# Patient Record
Sex: FEMALE | Race: BLACK OR AFRICAN AMERICAN | NOT HISPANIC OR LATINO | URBAN - METROPOLITAN AREA
[De-identification: names, ages, dates, MRNs, and addresses within clinical notes are randomized per-mention and may not be internally consistent; named-entity substitution may affect disease eponyms.]

---

## 2018-12-10 ENCOUNTER — INPATIENT (INPATIENT)
Facility: HOSPITAL | Age: 48
LOS: 2 days | Discharge: HOME | DRG: 101 | End: 2018-12-13
Attending: PSYCHIATRY & NEUROLOGY | Admitting: PSYCHIATRY & NEUROLOGY
Payer: MEDICARE

## 2018-12-10 VITALS
RESPIRATION RATE: 18 BRPM | OXYGEN SATURATION: 98 % | SYSTOLIC BLOOD PRESSURE: 124 MMHG | WEIGHT: 265 LBS | DIASTOLIC BLOOD PRESSURE: 8 MMHG | HEART RATE: 108 BPM

## 2018-12-10 LAB
ALBUMIN SERPL ELPH-MCNC: 3.7 G/DL — SIGNIFICANT CHANGE UP (ref 3.3–5)
ALP SERPL-CCNC: 109 U/L — SIGNIFICANT CHANGE UP (ref 40–120)
ALT FLD-CCNC: 27 U/L — SIGNIFICANT CHANGE UP (ref 10–45)
ANION GAP SERPL CALC-SCNC: 14 MMOL/L — SIGNIFICANT CHANGE UP (ref 5–17)
AST SERPL-CCNC: 36 U/L — SIGNIFICANT CHANGE UP (ref 10–40)
BASE EXCESS BLDV CALC-SCNC: -2.6 MMOL/L — SIGNIFICANT CHANGE UP
BASOPHILS NFR BLD AUTO: 0.5 % — SIGNIFICANT CHANGE UP (ref 0–2)
BILIRUB SERPL-MCNC: <0.2 MG/DL — SIGNIFICANT CHANGE UP (ref 0.2–1.2)
BUN SERPL-MCNC: 11 MG/DL — SIGNIFICANT CHANGE UP (ref 7–23)
CALCIUM SERPL-MCNC: 8.4 MG/DL — SIGNIFICANT CHANGE UP (ref 8.4–10.5)
CHLORIDE SERPL-SCNC: 100 MMOL/L — SIGNIFICANT CHANGE UP (ref 96–108)
CO2 SERPL-SCNC: 24 MMOL/L — SIGNIFICANT CHANGE UP (ref 22–31)
CREAT SERPL-MCNC: 0.79 MG/DL — SIGNIFICANT CHANGE UP (ref 0.5–1.3)
EOSINOPHIL NFR BLD AUTO: 2.8 % — SIGNIFICANT CHANGE UP (ref 0–6)
GLUCOSE SERPL-MCNC: 111 MG/DL — HIGH (ref 70–99)
HCO3 BLDV-SCNC: 22 MMOL/L — SIGNIFICANT CHANGE UP (ref 20–27)
HCT VFR BLD CALC: 37.3 % — SIGNIFICANT CHANGE UP (ref 34.5–45)
HGB BLD-MCNC: 12.3 G/DL — SIGNIFICANT CHANGE UP (ref 11.5–15.5)
LYMPHOCYTES # BLD AUTO: 26.4 % — SIGNIFICANT CHANGE UP (ref 13–44)
MCHC RBC-ENTMCNC: 31.8 PG — SIGNIFICANT CHANGE UP (ref 27–34)
MCHC RBC-ENTMCNC: 33 G/DL — SIGNIFICANT CHANGE UP (ref 32–36)
MCV RBC AUTO: 96.4 FL — SIGNIFICANT CHANGE UP (ref 80–100)
MONOCYTES NFR BLD AUTO: 12 % — SIGNIFICANT CHANGE UP (ref 2–14)
NEUTROPHILS NFR BLD AUTO: 58.3 % — SIGNIFICANT CHANGE UP (ref 43–77)
PCO2 BLDV: 40 MMHG — LOW (ref 41–51)
PH BLDV: 7.37 — SIGNIFICANT CHANGE UP (ref 7.32–7.43)
PHENYTOIN FREE SERPL-MCNC: 27.3 UG/ML — HIGH (ref 10–20)
PLATELET # BLD AUTO: 259 K/UL — SIGNIFICANT CHANGE UP (ref 150–400)
PO2 BLDV: 52 MMHG — SIGNIFICANT CHANGE UP
POTASSIUM SERPL-MCNC: 5 MMOL/L — SIGNIFICANT CHANGE UP (ref 3.5–5.3)
POTASSIUM SERPL-SCNC: 5 MMOL/L — SIGNIFICANT CHANGE UP (ref 3.5–5.3)
PROT SERPL-MCNC: 7.2 G/DL — SIGNIFICANT CHANGE UP (ref 6–8.3)
RBC # BLD: 3.87 M/UL — SIGNIFICANT CHANGE UP (ref 3.8–5.2)
RBC # FLD: 13.4 % — SIGNIFICANT CHANGE UP (ref 10.3–16.9)
SAO2 % BLDV: 86 % — SIGNIFICANT CHANGE UP
SODIUM SERPL-SCNC: 138 MMOL/L — SIGNIFICANT CHANGE UP (ref 135–145)
WBC # BLD: 6.5 K/UL — SIGNIFICANT CHANGE UP (ref 3.8–10.5)
WBC # FLD AUTO: 6.5 K/UL — SIGNIFICANT CHANGE UP (ref 3.8–10.5)

## 2018-12-10 PROCEDURE — 70450 CT HEAD/BRAIN W/O DYE: CPT | Mod: 26

## 2018-12-10 RX ORDER — SODIUM CHLORIDE 9 MG/ML
1000 INJECTION INTRAMUSCULAR; INTRAVENOUS; SUBCUTANEOUS ONCE
Qty: 0 | Refills: 0 | Status: COMPLETED | OUTPATIENT
Start: 2018-12-10 | End: 2018-12-10

## 2018-12-10 RX ORDER — ACETAMINOPHEN 500 MG
975 TABLET ORAL ONCE
Qty: 0 | Refills: 0 | Status: COMPLETED | OUTPATIENT
Start: 2018-12-10 | End: 2018-12-10

## 2018-12-10 RX ORDER — METOCLOPRAMIDE HCL 10 MG
10 TABLET ORAL ONCE
Qty: 0 | Refills: 0 | Status: COMPLETED | OUTPATIENT
Start: 2018-12-10 | End: 2018-12-10

## 2018-12-10 RX ADMIN — Medication 975 MILLIGRAM(S): at 23:19

## 2018-12-10 RX ADMIN — Medication 10 MILLIGRAM(S): at 23:20

## 2018-12-10 RX ADMIN — SODIUM CHLORIDE 2000 MILLILITER(S): 9 INJECTION INTRAMUSCULAR; INTRAVENOUS; SUBCUTANEOUS at 23:20

## 2018-12-10 NOTE — ED ADULT NURSE NOTE - OBJECTIVE STATEMENT
pt received awake and alert, no sob, skin warm and dry, no seizure activity noted, will continue to monitor

## 2018-12-10 NOTE — ED ADULT TRIAGE NOTE - CHIEF COMPLAINT QUOTE
seizure x 5  witnessed by EMS, pt alert Ox 3 now, seizure x 5  witnessed by EMS  + brief"  LOC no bladder incontinence reported, now alert Ox3 Versed IV 10 mg by EMS

## 2018-12-10 NOTE — ED ADULT NURSE NOTE - NSIMPLEMENTINTERV_GEN_ALL_ED
Implemented All Universal Safety Interventions:  [place] to call system. Call bell, personal items and telephone within reach. Instruct patient to call for assistance. Room bathroom lighting operational. Non-slip footwear when patient is off stretcher. Physically safe environment: no spills, clutter or unnecessary equipment. Stretcher in lowest position, wheels locked, appropriate side rails in place.

## 2018-12-10 NOTE — ED ADULT NURSE NOTE - CHIEF COMPLAINT QUOTE
seizure x 5  witnessed by EMS  + brief"  LOC no bladder incontinence reported, now alert Ox3 Versed IV 10 mg by EMS

## 2018-12-11 DIAGNOSIS — R63.8 OTHER SYMPTOMS AND SIGNS CONCERNING FOOD AND FLUID INTAKE: ICD-10-CM

## 2018-12-11 DIAGNOSIS — Z91.89 OTHER SPECIFIED PERSONAL RISK FACTORS, NOT ELSEWHERE CLASSIFIED: ICD-10-CM

## 2018-12-11 DIAGNOSIS — Z87.828 PERSONAL HISTORY OF OTHER (HEALED) PHYSICAL INJURY AND TRAUMA: Chronic | ICD-10-CM

## 2018-12-11 DIAGNOSIS — R53.1 WEAKNESS: ICD-10-CM

## 2018-12-11 DIAGNOSIS — I10 ESSENTIAL (PRIMARY) HYPERTENSION: ICD-10-CM

## 2018-12-11 DIAGNOSIS — G40.909 EPILEPSY, UNSPECIFIED, NOT INTRACTABLE, WITHOUT STATUS EPILEPTICUS: ICD-10-CM

## 2018-12-11 LAB
APPEARANCE UR: ABNORMAL
BILIRUB UR-MCNC: NEGATIVE — SIGNIFICANT CHANGE UP
COLOR SPEC: YELLOW — SIGNIFICANT CHANGE UP
DIFF PNL FLD: ABNORMAL
GLUCOSE UR QL: NEGATIVE — SIGNIFICANT CHANGE UP
KETONES UR-MCNC: NEGATIVE — SIGNIFICANT CHANGE UP
LACTATE SERPL-SCNC: 3.5 MMOL/L — HIGH (ref 0.5–2)
LEUKOCYTE ESTERASE UR-ACNC: NEGATIVE — SIGNIFICANT CHANGE UP
NITRITE UR-MCNC: POSITIVE
PH UR: 8.5 — HIGH (ref 5–8)
PROT UR-MCNC: NEGATIVE MG/DL — SIGNIFICANT CHANGE UP
SP GR SPEC: 1.01 — SIGNIFICANT CHANGE UP (ref 1–1.03)
UROBILINOGEN FLD QL: 0.2 E.U./DL — SIGNIFICANT CHANGE UP

## 2018-12-11 PROCEDURE — 71045 X-RAY EXAM CHEST 1 VIEW: CPT | Mod: 26

## 2018-12-11 PROCEDURE — 99223 1ST HOSP IP/OBS HIGH 75: CPT

## 2018-12-11 PROCEDURE — 93010 ELECTROCARDIOGRAM REPORT: CPT

## 2018-12-11 PROCEDURE — 99285 EMERGENCY DEPT VISIT HI MDM: CPT | Mod: 25

## 2018-12-11 RX ORDER — NITROFURANTOIN MACROCRYSTAL 50 MG
100 CAPSULE ORAL
Qty: 0 | Refills: 0 | Status: DISCONTINUED | OUTPATIENT
Start: 2018-12-11 | End: 2018-12-13

## 2018-12-11 RX ORDER — ALBUTEROL 90 UG/1
0 AEROSOL, METERED ORAL
Qty: 0 | Refills: 0 | COMMUNITY

## 2018-12-11 RX ORDER — HYDROCHLOROTHIAZIDE 25 MG
12.5 TABLET ORAL DAILY
Qty: 0 | Refills: 0 | Status: DISCONTINUED | OUTPATIENT
Start: 2018-12-11 | End: 2018-12-13

## 2018-12-11 RX ORDER — NITROFURANTOIN MACROCRYSTAL 50 MG
1 CAPSULE ORAL
Qty: 14 | Refills: 0 | OUTPATIENT
Start: 2018-12-11 | End: 2018-12-17

## 2018-12-11 RX ORDER — MIRTAZAPINE 45 MG/1
1 TABLET, ORALLY DISINTEGRATING ORAL
Qty: 0 | Refills: 0 | COMMUNITY

## 2018-12-11 RX ORDER — NITROFURANTOIN MACROCRYSTAL 50 MG
100 CAPSULE ORAL ONCE
Qty: 0 | Refills: 0 | Status: COMPLETED | OUTPATIENT
Start: 2018-12-11 | End: 2018-12-11

## 2018-12-11 RX ORDER — LIDOCAINE 4 G/100G
1 CREAM TOPICAL DAILY
Qty: 0 | Refills: 0 | Status: DISCONTINUED | OUTPATIENT
Start: 2018-12-11 | End: 2018-12-13

## 2018-12-11 RX ORDER — MONTELUKAST 4 MG/1
1 TABLET, CHEWABLE ORAL
Qty: 0 | Refills: 0 | COMMUNITY

## 2018-12-11 RX ORDER — LIDOCAINE 4 G/100G
1 CREAM TOPICAL
Qty: 0 | Refills: 0 | COMMUNITY

## 2018-12-11 RX ORDER — AMITRIPTYLINE HCL 25 MG
150 TABLET ORAL AT BEDTIME
Qty: 0 | Refills: 0 | Status: DISCONTINUED | OUTPATIENT
Start: 2018-12-11 | End: 2018-12-13

## 2018-12-11 RX ORDER — AMITRIPTYLINE HCL 25 MG
1 TABLET ORAL
Qty: 0 | Refills: 0 | COMMUNITY

## 2018-12-11 RX ORDER — ATENOLOL 25 MG/1
25 TABLET ORAL DAILY
Qty: 0 | Refills: 0 | Status: DISCONTINUED | OUTPATIENT
Start: 2018-12-11 | End: 2018-12-13

## 2018-12-11 RX ORDER — KETOROLAC TROMETHAMINE 30 MG/ML
15 SYRINGE (ML) INJECTION ONCE
Qty: 0 | Refills: 0 | Status: DISCONTINUED | OUTPATIENT
Start: 2018-12-11 | End: 2018-12-11

## 2018-12-11 RX ORDER — HEPARIN SODIUM 5000 [USP'U]/ML
7500 INJECTION INTRAVENOUS; SUBCUTANEOUS EVERY 8 HOURS
Qty: 0 | Refills: 0 | Status: DISCONTINUED | OUTPATIENT
Start: 2018-12-11 | End: 2018-12-13

## 2018-12-11 RX ORDER — ATENOLOL 25 MG/1
1 TABLET ORAL
Qty: 0 | Refills: 0 | COMMUNITY

## 2018-12-11 RX ORDER — AMLODIPINE BESYLATE 2.5 MG/1
10 TABLET ORAL DAILY
Qty: 0 | Refills: 0 | Status: DISCONTINUED | OUTPATIENT
Start: 2018-12-11 | End: 2018-12-13

## 2018-12-11 RX ORDER — MIRTAZAPINE 45 MG/1
30 TABLET, ORALLY DISINTEGRATING ORAL AT BEDTIME
Qty: 0 | Refills: 0 | Status: DISCONTINUED | OUTPATIENT
Start: 2018-12-11 | End: 2018-12-13

## 2018-12-11 RX ORDER — BENZOCAINE AND MENTHOL 5; 1 G/100ML; G/100ML
1 LIQUID ORAL ONCE
Qty: 0 | Refills: 0 | Status: COMPLETED | OUTPATIENT
Start: 2018-12-11 | End: 2018-12-11

## 2018-12-11 RX ORDER — SODIUM CHLORIDE 9 MG/ML
1000 INJECTION INTRAMUSCULAR; INTRAVENOUS; SUBCUTANEOUS ONCE
Qty: 0 | Refills: 0 | Status: COMPLETED | OUTPATIENT
Start: 2018-12-11 | End: 2018-12-11

## 2018-12-11 RX ORDER — AMLODIPINE BESYLATE 2.5 MG/1
1 TABLET ORAL
Qty: 0 | Refills: 0 | COMMUNITY

## 2018-12-11 RX ORDER — MONTELUKAST 4 MG/1
10 TABLET, CHEWABLE ORAL DAILY
Qty: 0 | Refills: 0 | Status: DISCONTINUED | OUTPATIENT
Start: 2018-12-11 | End: 2018-12-13

## 2018-12-11 RX ADMIN — Medication 200 MILLIGRAM(S): at 11:38

## 2018-12-11 RX ADMIN — Medication 15 MILLIGRAM(S): at 21:48

## 2018-12-11 RX ADMIN — LIDOCAINE 1 PATCH: 4 CREAM TOPICAL at 18:15

## 2018-12-11 RX ADMIN — SODIUM CHLORIDE 4000 MILLILITER(S): 9 INJECTION INTRAMUSCULAR; INTRAVENOUS; SUBCUTANEOUS at 08:55

## 2018-12-11 RX ADMIN — AMLODIPINE BESYLATE 10 MILLIGRAM(S): 2.5 TABLET ORAL at 11:38

## 2018-12-11 RX ADMIN — Medication 12.5 MILLIGRAM(S): at 11:38

## 2018-12-11 RX ADMIN — BENZOCAINE AND MENTHOL 1 LOZENGE: 5; 1 LIQUID ORAL at 15:11

## 2018-12-11 RX ADMIN — HEPARIN SODIUM 7500 UNIT(S): 5000 INJECTION INTRAVENOUS; SUBCUTANEOUS at 21:48

## 2018-12-11 RX ADMIN — Medication 100 MILLIGRAM(S): at 15:15

## 2018-12-11 RX ADMIN — Medication 975 MILLIGRAM(S): at 02:42

## 2018-12-11 RX ADMIN — HEPARIN SODIUM 7500 UNIT(S): 5000 INJECTION INTRAVENOUS; SUBCUTANEOUS at 15:10

## 2018-12-11 RX ADMIN — Medication 150 MILLIGRAM(S): at 18:03

## 2018-12-11 RX ADMIN — ATENOLOL 25 MILLIGRAM(S): 25 TABLET ORAL at 11:37

## 2018-12-11 RX ADMIN — Medication 100 MILLIGRAM(S): at 02:41

## 2018-12-11 RX ADMIN — Medication 15 MILLIGRAM(S): at 22:05

## 2018-12-11 RX ADMIN — LIDOCAINE 1 PATCH: 4 CREAM TOPICAL at 15:10

## 2018-12-11 RX ADMIN — MONTELUKAST 10 MILLIGRAM(S): 4 TABLET, CHEWABLE ORAL at 11:38

## 2018-12-11 NOTE — ED ADULT NURSE REASSESSMENT NOTE - NS ED NURSE REASSESS COMMENT FT1
Pt received from night NOAH Dailey. Pt awaiting bed. Pt resting comfortably in NAD. VSS. Will continue to monitor.

## 2018-12-11 NOTE — H&P ADULT - PROBLEM SELECTOR PLAN 4
DASH diet  Ppx: c/w mirtazapine 30mg qhs for insomnia, HSQ 7500 q8h DASH diet  Ppx: c/w amitriptyline 150mg qhs and mirtazapine 30mg qhs for insomnia, HSQ 7500 q8h

## 2018-12-11 NOTE — ED PROVIDER NOTE - PHYSICAL EXAMINATION
VITAL SIGNS: I have reviewed nursing notes and confirm.  CONSTITUTIONAL: Well-developed; well-nourished obese female comfortable and somnolent in stretcher, wakes easily to verbal stimuli, appropriately following commands, A&Ox3; in no acute distress.  SKIN: Agree with RN documentation regarding decubitus evaluation. Remainder of skin exam is warm and dry, no acute rash.  HEAD: Normocephalic; atraumatic.  EYES: PERRL, EOM intact; conjunctiva and sclera clear.  ENT: No nasal discharge; airway clear.  NECK: Supple; non tender.  CARD: S1, S2 normal; no murmurs, gallops, or rubs. Regular rate and rhythm.  RESP: No wheezes, rales or rhonchi.  ABD: Normal bowel sounds; soft; non-distended; non-tender; no hepatosplenomegaly.  EXT: Normal ROM. No clubbing, cyanosis or edema.  LYMPH: No acute cervical adenopathy.  NEURO: Alert, oriented. Grossly unremarkable. CN 2-12 intact b/l, 5/5 strength all ext, no sensory deficits, speech intact  PSYCH: Cooperative, appropriate.

## 2018-12-11 NOTE — ED PROVIDER NOTE - NSFOLLOWUPINSTRUCTIONS_ED_ALL_ED_FT
Follow up with cardiologist as scheduled December 12.  Follow up with your epilepsy doctor this week.

## 2018-12-11 NOTE — H&P ADULT - NSHPSOCIALHISTORY_GEN_ALL_CORE
has HHA 24 hours per patient with son, daughter, and spouse who live with her. walks using brace, is transported intermittently using wheelchair. Denies smoking and ETOH use (ever) has HHA 24 hours per patient with son, daughter, and spouse who live with her. walks using brace on left leg, is transported intermittently using wheelchair. Denies smoking and ETOH use (ever)

## 2018-12-11 NOTE — ED ADULT NURSE REASSESSMENT NOTE - NS ED NURSE REASSESS COMMENT FT1
pt slated for discharge, awake and alert, walker placed at bedside,  pt assisted from stretcher to a standing position and proceeded ti exhibit shaking/jerking of bilateral hands and feet. no incontinence noted at the time of shaking  activity. pt remained alert , continues to answer questions. pt placed back on cardiac monitor, lactate sent, saline lock replaced. family at bedside and involved in plan of care.

## 2018-12-11 NOTE — H&P ADULT - PROBLEM SELECTOR PLAN 5
1) PCP Contacted on Admission: (Y/N) --> Name & Phone #: RAMIRO Brian Silva [phone], contacted 12/11, awaiting response   2) Date of Contact with PCP:  3) PCP Contacted at Discharge: (Y/N, N/A)  4) Summary of Handoff Given to PCP:   5) Post-Discharge Appointment Date and Location:

## 2018-12-11 NOTE — H&P ADULT - NSHPPHYSICALEXAM_GEN_ALL_CORE
General: morbidly obese AA female, NAD  HEENT: NC/AT; PERRL, clear conjunctiva. Tongue with left sided bruising, appears old   Neck: supple  Respiratory: CTA b/l  Cardiovascular: tachycardic, no murmurs   Abdomen: soft, NT/ND; +BS x4  Extremities: WWP, 2+ peripheral pulses b/l; no LE edema  Skin: normal color and turgor; no rash  MSK: no joint swelling  Neurological: AAO3, left sided LE and UE loss of sensation (patient states chronic for a year now). Can raise right leg and UE against gravity and keep up (3/5 strength); raises LLE and LUE against gravity by herself but cannot keep it up (?). Does not respond to painful stimuli on left side (UE and LE). General: morbidly obese AA female, NAD  HEENT: NC/AT; PERRL, clear conjunctiva. Tongue with left sided bruising, appears old   Neck: supple  Respiratory: CTA b/l  Cardiovascular: tachycardic, no murmurs   Abdomen: soft, NT/ND; +BS x4  Extremities: WWP, 2+ peripheral pulses b/l; no LE edema  Skin: normal color and turgor; no rash  MSK: no joint swelling  Neurological: AAO3, left sided LE and UE loss of sensation (patient states chronic for a year now). Can raise right leg and UE against gravity and keep up (3/5 strength); raises LLE and LUE against gravity by herself but cannot keep it up (?). Does not respond to painful stimuli on left side (UE and LE). b/l facial sensation intact

## 2018-12-11 NOTE — H&P ADULT - NSHPLABSRESULTS_GEN_ALL_CORE
12.3   6.5   )-----------( 259      ( 10 Dec 2018 23:04 )             37.3     12-10    138  |  100  |  11  ----------------------------<  111<H>  5.0   |  24  |  0.79    Ca    8.4      10 Dec 2018 23:04    TPro  7.2  /  Alb  3.7  /  TBili  <0.2  /  DBili  x   /  AST  36  /  ALT  27  /  AlkPhos  109  12-10    Lactate, Blood (12.11.18 @ 06:59)    Lactate, Blood: 3.5 mmoL/L    EKG w/o ischemic changes, no interval abnormalities 12.3   6.5   )-----------( 259      ( 10 Dec 2018 23:04 )             37.3     12-10    138  |  100  |  11  ----------------------------<  111<H>  5.0   |  24  |  0.79    Ca    8.4      10 Dec 2018 23:04    TPro  7.2  /  Alb  3.7  /  TBili  <0.2  /  DBili  x   /  AST  36  /  ALT  27  /  AlkPhos  109  12-10    Lactate, Blood (12.11.18 @ 06:59)    Lactate, Blood: 3.5 mmoL/L    EKG w/o ischemic changes, no interval abnormalities    < from: CT Head No Cont (12.10.18 @ 23:43) >      IMPRESSION:     No acute intracranial abnormality. Reported brain tumor is poorly   visualized on this noncontrast study. Correlation with prior outside   imaging is advised.    < end of copied text >

## 2018-12-11 NOTE — H&P ADULT - ASSESSMENT
holding amitryp dec seizure threshold     dilantin 3 x a day?     remeron may lower seizure threshold     oxy may exacerbate seizures 47 yo with hx of HTN, mild asthma, unspecified seizure d/o diagnosed at age 13 presenting today by EMS with CC seizure activity at home, admitted for further w/u.
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
## 2018-12-11 NOTE — H&P ADULT - PROBLEM SELECTOR PLAN 2
Slightly elevated in 150s systolic at this time.   -c/w HCTZ 12.5mg   -atenolol 25mg qd   -norvasc 10mg qd

## 2018-12-11 NOTE — ED PROVIDER NOTE - MEDICAL DECISION MAKING DETAILS
+ seizure activity w/known seizure disorder. Somnolent in ED, possibly 2/2 post-ictal state vs benzo effect (10mg IM versed given by EMS). Otherwise neuro intact w/out evidence of infectious process or trauma. CT negative for acute pathology, despite report from son that she has a brain tumor. + phenytoin level elevated, will f/u with neuro this week to adjust dosing. Pending improved alertness/metabolization of versed, cleared for discharge. + seizure activity w/known seizure disorder. Somnolent in ED, possibly 2/2 post-ictal state vs benzo effect (10mg IM versed given by EMS). Otherwise neuro intact w/out evidence of infectious process or trauma. CT negative for acute pathology, despite report from son that she has a brain tumor. + phenytoin level elevated, will f/u with neuro this week to adjust dosing. Pending improved alertness/metabolization of versed, cleared for discharge. Treated for + UA with macrobid.

## 2018-12-11 NOTE — PROVIDER CONTACT NOTE (OTHER) - ASSESSMENT
witnessed tonic clonic activity involving BL UE and BL LE, approx 20 seconds long. Post tonic-clonic activity, patient was minimally responsive for 30 seconds. PERRLA. Patient now A+O x3, able to follow directions.  VS: 98.7 P81 R18 /77 O2-98%

## 2018-12-11 NOTE — ED PROVIDER NOTE - PROGRESS NOTE DETAILS
pt awake and alert.  says she has a painful cough, hurts in the sternal region when coughing.  says she's had it for a long time and seen a pulmonologist about it.  lungs clear.  tenderness to palpation of sternum. will check CXR and EKG. pt awake and alert.  says she has a painful cough, hurts in the sternal region when coughing.  says she's had it for a long time and seen a pulmonologist about it.  lungs clear.  tenderness to palpation of sternum. will check CXR and EKG.  She has appointment with cardiologist on Dec 12. patient stating she is weak and when tried to get up out of bed for discahrge had another possible seizure.  lasted apx 15 seconds, no postictal and not incontinent of urine.  bed wet of urine from earlier.  will contact epilepsy for admission. lactate 3.5 noted, unclear if true seizure activity so that was the indication for checking lactate.  do not suspect sepsis. lactate 3.5 noted, unclear if true seizure activity so that was the indication for checking lactate.  do not suspect sepsis.  AVSS (has been on monitor, normotensive and no tachycardia). she is currently awake and alert, without any complaints.  appears comfortable.
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
## 2018-12-12 ENCOUNTER — TRANSCRIPTION ENCOUNTER (OUTPATIENT)
Age: 48
End: 2018-12-12

## 2018-12-12 DIAGNOSIS — H05.20 UNSPECIFIED EXOPHTHALMOS: ICD-10-CM

## 2018-12-12 DIAGNOSIS — N39.0 URINARY TRACT INFECTION, SITE NOT SPECIFIED: ICD-10-CM

## 2018-12-12 DIAGNOSIS — R05 COUGH: ICD-10-CM

## 2018-12-12 LAB
CULTURE RESULTS: SIGNIFICANT CHANGE UP
SPECIMEN SOURCE: SIGNIFICANT CHANGE UP

## 2018-12-12 PROCEDURE — 93010 ELECTROCARDIOGRAM REPORT: CPT

## 2018-12-12 PROCEDURE — 95951: CPT | Mod: 26

## 2018-12-12 PROCEDURE — 99239 HOSP IP/OBS DSCHRG MGMT >30: CPT

## 2018-12-12 RX ORDER — NITROFURANTOIN MACROCRYSTAL 50 MG
1 CAPSULE ORAL
Qty: 2 | Refills: 0 | OUTPATIENT
Start: 2018-12-12 | End: 2018-12-12

## 2018-12-12 RX ORDER — CYCLOBENZAPRINE HYDROCHLORIDE 10 MG/1
5 TABLET, FILM COATED ORAL ONCE
Qty: 0 | Refills: 0 | Status: COMPLETED | OUTPATIENT
Start: 2018-12-12 | End: 2018-12-12

## 2018-12-12 RX ORDER — KETOROLAC TROMETHAMINE 30 MG/ML
15 SYRINGE (ML) INJECTION ONCE
Qty: 0 | Refills: 0 | Status: DISCONTINUED | OUTPATIENT
Start: 2018-12-12 | End: 2018-12-12

## 2018-12-12 RX ORDER — ACETAMINOPHEN 500 MG
650 TABLET ORAL ONCE
Qty: 0 | Refills: 0 | Status: COMPLETED | OUTPATIENT
Start: 2018-12-12 | End: 2018-12-12

## 2018-12-12 RX ORDER — IPRATROPIUM/ALBUTEROL SULFATE 18-103MCG
3 AEROSOL WITH ADAPTER (GRAM) INHALATION EVERY 6 HOURS
Qty: 0 | Refills: 0 | Status: DISCONTINUED | OUTPATIENT
Start: 2018-12-12 | End: 2018-12-13

## 2018-12-12 RX ADMIN — HEPARIN SODIUM 7500 UNIT(S): 5000 INJECTION INTRAVENOUS; SUBCUTANEOUS at 14:22

## 2018-12-12 RX ADMIN — LIDOCAINE 1 PATCH: 4 CREAM TOPICAL at 23:00

## 2018-12-12 RX ADMIN — ATENOLOL 25 MILLIGRAM(S): 25 TABLET ORAL at 09:30

## 2018-12-12 RX ADMIN — Medication 100 MILLIGRAM(S): at 04:33

## 2018-12-12 RX ADMIN — Medication 15 MILLIGRAM(S): at 15:38

## 2018-12-12 RX ADMIN — Medication 150 MILLIGRAM(S): at 18:27

## 2018-12-12 RX ADMIN — Medication 15 MILLIGRAM(S): at 03:21

## 2018-12-12 RX ADMIN — CYCLOBENZAPRINE HYDROCHLORIDE 5 MILLIGRAM(S): 10 TABLET, FILM COATED ORAL at 14:21

## 2018-12-12 RX ADMIN — Medication 12.5 MILLIGRAM(S): at 07:41

## 2018-12-12 RX ADMIN — HEPARIN SODIUM 7500 UNIT(S): 5000 INJECTION INTRAVENOUS; SUBCUTANEOUS at 22:26

## 2018-12-12 RX ADMIN — MIRTAZAPINE 30 MILLIGRAM(S): 45 TABLET, ORALLY DISINTEGRATING ORAL at 02:21

## 2018-12-12 RX ADMIN — Medication 150 MILLIGRAM(S): at 07:41

## 2018-12-12 RX ADMIN — Medication 150 MILLIGRAM(S): at 22:27

## 2018-12-12 RX ADMIN — HEPARIN SODIUM 7500 UNIT(S): 5000 INJECTION INTRAVENOUS; SUBCUTANEOUS at 07:41

## 2018-12-12 RX ADMIN — Medication 3 MILLILITER(S): at 22:25

## 2018-12-12 RX ADMIN — Medication 200 MILLIGRAM(S): at 09:30

## 2018-12-12 RX ADMIN — LIDOCAINE 1 PATCH: 4 CREAM TOPICAL at 19:52

## 2018-12-12 RX ADMIN — Medication 200 MILLIGRAM(S): at 18:27

## 2018-12-12 RX ADMIN — MONTELUKAST 10 MILLIGRAM(S): 4 TABLET, CHEWABLE ORAL at 11:28

## 2018-12-12 RX ADMIN — Medication 150 MILLIGRAM(S): at 02:22

## 2018-12-12 RX ADMIN — Medication 100 MILLIGRAM(S): at 14:21

## 2018-12-12 RX ADMIN — Medication 15 MILLIGRAM(S): at 03:06

## 2018-12-12 RX ADMIN — Medication 15 MILLIGRAM(S): at 15:55

## 2018-12-12 RX ADMIN — LIDOCAINE 1 PATCH: 4 CREAM TOPICAL at 11:29

## 2018-12-12 RX ADMIN — LIDOCAINE 1 PATCH: 4 CREAM TOPICAL at 03:02

## 2018-12-12 RX ADMIN — Medication 15 MILLIGRAM(S): at 22:26

## 2018-12-12 RX ADMIN — AMLODIPINE BESYLATE 10 MILLIGRAM(S): 2.5 TABLET ORAL at 07:41

## 2018-12-12 RX ADMIN — Medication 15 MILLIGRAM(S): at 23:15

## 2018-12-12 RX ADMIN — MIRTAZAPINE 30 MILLIGRAM(S): 45 TABLET, ORALLY DISINTEGRATING ORAL at 22:27

## 2018-12-12 NOTE — PROGRESS NOTE ADULT - PROBLEM SELECTOR PLAN 4
DASH diet  Ppx: c/w amitriptyline 150mg qhs and mirtazapine 30mg qhs for insomnia, HSQ 7500 q8h Pt has history of asthma  - PRN Duonebs for SOB and wheezing

## 2018-12-12 NOTE — PHYSICAL THERAPY INITIAL EVALUATION ADULT - CRITERIA FOR SKILLED THERAPEUTIC INTERVENTIONS
functional limitations in following categories/therapy frequency/anticipated discharge recommendation/rehab potential/impairments found

## 2018-12-12 NOTE — DISCHARGE NOTE ADULT - PATIENT PORTAL LINK FT
You can access the [facility] Portal, offered by [facility], by registering with the following website: [email or web address]

## 2018-12-12 NOTE — PHYSICAL THERAPY INITIAL EVALUATION ADULT - GENERAL OBSERVATIONS, REHAB EVAL
Pt received supine in bed with +hep-lock, +EEG, NAD, family present, Pt left as found, NAD, call bell in reach, family present, RN awares, +bed alarm.

## 2018-12-12 NOTE — PHYSICAL THERAPY INITIAL EVALUATION ADULT - ADDITIONAL COMMENTS
Pt lives with children in an apartment with elevator, denies stair. Pt always have someone/family member assist her with ADLs at home all the time. As per pt's daughter Pt can walk short distance with assistance with cane, sometime utilize wheelchair to get around.

## 2018-12-12 NOTE — PROVIDER CONTACT NOTE (CHANGE IN STATUS NOTIFICATION) - ACTION/TREATMENT ORDERED:
EKG done, toradol 15mg ivp given, assessed by Dr Santillan; incontinent care done and prima fit in place' oral suction and seizure card test done; safety checked

## 2018-12-12 NOTE — DISCHARGE NOTE ADULT - CARE PLAN
Principal Discharge DX:	Seizure disorder  Goal:	Please continue to take your anti-seizure medications and follow up with your doctor  Assessment and plan of treatment:	You came to the hospital after experiencing multiple seizures in a day including in the ambulance and in the ER. You were admitted to monitor your seizure activity on EEG. You were continued on your home medication of 200mg Dilantin twice a day. You should continue on this medication at home. You can arrange to have an outpatient MRI with your doctor if they recommend it.  Secondary Diagnosis:	UTI (urinary tract infection)  Assessment and plan of treatment:	You were found to have a urinary tract infection (UTI) when you came to the emergency room. You were not having symptoms but you were started on antibiotics for 3 days in case the UTI was the reason that you had a seizure.  Secondary Diagnosis:	Weakness  Assessment and plan of treatment:	Continue your home lyrica and lidocaine patches. Continue your home oxycodone as you need it for pain. Physical therapy attempted to see you in the hospital but you were in pain and did not wish for them to continue. It is recommended that you follow up with your doctor to evaluate for a need for physical therapy.  Secondary Diagnosis:	Hypertension, unspecified type  Assessment and plan of treatment:	Please continue to take Hydrochlorothiazide 12.5mg daily, atenolol 25mg daily and norvasc 10mg daily.  Secondary Diagnosis:	Asthma  Assessment and plan of treatment:	Please continue to take your inhalers as prescribed Principal Discharge DX:	Seizure disorder  Goal:	Please continue to take your anti-seizure medications and follow up with your doctor  Assessment and plan of treatment:	You came to the hospital after experiencing multiple seizures in a day including in the ambulance and in the ER. You were admitted to monitor your seizure activity on EEG. You were continued on your home medication of 200mg Dilantin twice a day. You should continue on this medication at home. You can arrange to have an outpatient MRI with your doctor if they recommend it. Please follow up with Dr. Johnston, a neurologist, on January 10th, 2019 at 9:30PM.  Secondary Diagnosis:	UTI (urinary tract infection)  Assessment and plan of treatment:	You were found to have a urinary tract infection (UTI) when you came to the emergency room. You were not having symptoms but you were started on antibiotics for 3 days in case the UTI was the reason that you had a seizure.  Secondary Diagnosis:	Weakness  Assessment and plan of treatment:	Continue your home lyrica and lidocaine patches. Continue your home oxycodone as you need it for pain. Physical therapy attempted to see you in the hospital but you were in pain and did not wish for them to continue. It is recommended that you follow up with your doctor to evaluate for a need for physical therapy.  Secondary Diagnosis:	Hypertension, unspecified type  Assessment and plan of treatment:	Please continue to take Hydrochlorothiazide 12.5mg daily, atenolol 25mg daily and norvasc 10mg daily.  Secondary Diagnosis:	Asthma  Assessment and plan of treatment:	Please continue to take your inhalers as prescribed

## 2018-12-12 NOTE — DISCHARGE NOTE ADULT - CARE PROVIDER_API CALL
Christine Johnston), EEGEpilepsy; Neurology; Neurophysiology  [address] [place] [address]  Phone: [phone]  Fax: [phone]

## 2018-12-12 NOTE — DISCHARGE NOTE ADULT - HOSPITAL COURSE
49yo with hx of HTN, asthma, unspecified seizure d/o diagnosed at age 13 presented by EMS with complaint of seizure activity at home. Pt given 10mg versed in field, with reported witnessed generalized tonic clonic seizure with urinary incontinence and then a post-ictal state. Upon arrival of medicine team, pt able to provide history and AAOx3, poor historian however. Son mentioned a possible brain tumor which she said was found during childhood which was not seen on CTH in the ED. On dilantin at home which was up-titrated 2-4 weeks ago s/p [facility] hospitalization for seizures, now on 200mg BID from 150mg BID. Characterizes her seizures as having urinary incontinence usually, tongue biting, LOC, has fallen on her head in past during episode, as far as she knows they are generalized. On this episode, she remembers lying down and talking to her son/watching TV when tongue started fasciculating, and all she knows from there was daughter called EMS 2/2 "all over shaking." Missed no doses of medication in past month. Night after admission, pt had 2 episodes of seizure-like activity. First was before EEG was connected, witnessed by pt's son who said she had LOC with eyes rolling back and tongue protruding and bowel/bladder incontinence followed by reproducible CP and second seizure had no epileptiform activity on EEG. Was deemed non-epileptic seizure. Pt was continued on Dilantin 200mg BID. Pt had nonsymptomatic UTI found on UA, put on 3 days macrobid for concern that UTI was inciting event for seizure. Pt also complaining of complete LLE weakness and decreased sensation, stated she gets around with a wheelchair at home. PT was unable to work with pt and she was complaining of HA and need to lay down. Pt's PCP was contacted and reported that pt walks into his office when she sees him.

## 2018-12-12 NOTE — DISCHARGE NOTE ADULT - MEDICATION SUMMARY - MEDICATIONS TO TAKE
I will START or STAY ON the medications listed below when I get home from the hospital:    oxyCODONE-acetaminophen 10 mg-325 mg oral tablet  -- orally once a day  -- Indication: For pain    phenytoin 200 mg oral capsule, extended release  -- orally 2 times a day  -- Indication: For SEIZURE    Lyrica 150 mg oral capsule  -- 1 cap(s) by mouth 2 times a day  -- Indication: For Neuropathy    amitriptyline 150 mg oral tablet  -- 1 tab(s) by mouth once a day (at bedtime)  -- Indication: For Antidepressant    mirtazapine 30 mg oral tablet  -- 1 tab(s) by mouth once a day (at bedtime)  -- Indication: For Antidepressant    atenolol 25 mg oral tablet  -- 1 tab(s) by mouth once a day  -- Indication: For Hypertension, unspecified type    ProAir HFA 90 mcg/inh inhalation aerosol  -- Indication: For Asthma    Norvasc 10 mg oral tablet  -- 1 tab(s) by mouth once a day  -- Indication: For Hypertension, unspecified type    lidocaine 5% topical film  -- Apply on skin to affected area once a day  -- Indication: For pain in back    hydroCHLOROthiazide 12.5 mg oral capsule  -- 1 cap(s) by mouth once a day  -- Indication: For Hypertension, unspecified type    Singulair 10 mg oral tablet  -- 1 tab(s) by mouth once a day  -- Indication: For Asthma
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
## 2018-12-12 NOTE — PHYSICAL THERAPY INITIAL EVALUATION ADULT - IMPAIRMENTS FOUND, PT EVAL
aerobic capacity/endurance/muscle strength/poor safety awareness/sensory integrity/gait, locomotion, and balance/gross motor

## 2018-12-12 NOTE — PROGRESS NOTE ADULT - ASSESSMENT
47 yo with hx of HTN, mild asthma, unspecified seizure d/o diagnosed at age 13 presenting today by EMS with CC seizure activity at home, admitted for further w/u.

## 2018-12-12 NOTE — PHYSICAL THERAPY INITIAL EVALUATION ADULT - ACTIVE RANGE OF MOTION EXAMINATION, REHAB EVAL
AROM WFL through out except left shoulder flexion  AROM 0-90 degrees, + left foot drop. AROM WFL through out except left shoulder flexion  AROM 0-90 degrees,  LLE AAROM EFL through out, + left foot drop.

## 2018-12-12 NOTE — PHYSICAL THERAPY INITIAL EVALUATION ADULT - PLANNED THERAPY INTERVENTIONS, PT EVAL
gait training/wheelchair management/propulsion training/bed mobility training/balance training/strengthening/transfer training

## 2018-12-12 NOTE — PROGRESS NOTE ADULT - SUBJECTIVE AND OBJECTIVE BOX
OVERNIGHT EVENTS: Patient had seizure like activity observed on video EEG last night.    SUBJECTIVE / INTERVAL HPI: Patient seen and examined at bedside.     VITAL SIGNS:  Vital Signs Last 24 Hrs  T(C): 36.8 (12 Dec 2018 09:23), Max: 36.8 (11 Dec 2018 16:44)  T(F): 98.2 (12 Dec 2018 09:23), Max: 98.3 (11 Dec 2018 16:44)  HR: 86 (12 Dec 2018 09:23) (80 - 93)  BP: 134/86 (12 Dec 2018 09:23) (99/70 - 147/81)  BP(mean): --  RR: 18 (12 Dec 2018 09:23) (16 - 22)  SpO2: 100% (12 Dec 2018 09:23) (92% - 100%)    18 @ 07:01  -  18 @ 10:57  --------------------------------------------------------  IN: 0 mL / OUT: 500 mL / NET: -500 mL    PHYSICAL EXAM:  General: NAD  HEENT: NC/AT, anicteric sclera; MMM  Neck: supple  Cardiovascular: +S1/S2, RRR  Respiratory: CTA B/L, no W/R/R  Gastrointestinal: soft, NT/ND; +BSx4  Extremities: WWP; no edema, clubbing or cyanosis  Vascular: 2+ radial, DP/PT pulses B/L  Neurological: AAOx3, no focal deficits    MEDICATIONS  (STANDING):  amitriptyline 150 milliGRAM(s) Oral at bedtime  amLODIPine   Tablet 10 milliGRAM(s) Oral daily  ATENolol  Tablet 25 milliGRAM(s) Oral daily  heparin  Injectable 7500 Unit(s) SubCutaneous every 8 hours  hydrochlorothiazide 12.5 milliGRAM(s) Oral daily  lidocaine   Patch 1 Patch Transdermal daily  mirtazapine 30 milliGRAM(s) Oral at bedtime  montelukast 10 milliGRAM(s) Oral daily  nitrofurantoin monohydrate/macrocrystals (MACROBID) 100 milliGRAM(s) Oral two times a day with meals  phenytoin   Capsule 200 milliGRAM(s) Oral two times a day  pregabalin 150 milliGRAM(s) Oral two times a day    MEDICATIONS  (PRN):  LORazepam   Injectable 1 milliGRAM(s) IV Push every 4 hours PRN seizure activity > 2 min    Allergies  penicillin (Rash)    LABS:                      12.3   6.5   )-----------( 259      ( 10 Dec 2018 23:04 )             37.3   12-10  138  |  100  |  11  ----------------------------<  111<H>  5.0   |  24  |  0.79  Ca    8.4      10 Dec 2018 23:04  TPro  7.2  /  Alb  3.7  /  TBili  <0.2  /  DBili  x   /  AST  36  /  ALT  27  /  AlkPhos  109  12-10    Urinalysis Basic - ( 11 Dec 2018 18:16 )  Color: Yellow / Appearance: SL Cloudy / S.010 / pH: x  Gluc: x / Ketone: NEGATIVE  / Bili: Negative / Urobili: 0.2 E.U./dL   Blood: x / Protein: NEGATIVE mg/dL / Nitrite: NEGATIVE   Leuk Esterase: NEGATIVE / RBC: < 5 /HPF / WBC < 5 /HPF   Sq Epi: x / Non Sq Epi: 5-10 /HPF / Bacteria: Present /HPF    POCT Blood Glucose.: 103 mg/dL (10 Dec 2018 22:40)    RADIOLOGY & ADDITIONAL TESTS: Reviewed.  < from: CT Head No Cont (12.10.18 @ 23:43) >  IMPRESSION:   No acute intracranial abnormality. Reported brain tumor is poorly   visualized on this noncontrast study. Correlation with prior outside   imaging is advised. OVERNIGHT EVENTS: Patient had seizure like activity last night at 2AM. No epileptiform activity was noted on EEG.    SUBJECTIVE / INTERVAL HPI: Patient seen and examined at bedside. Patient complaining of chronic LLE weakness. She reports SOB and states she feels better on NC. Patient reports HA and cough.    VITAL SIGNS:  Vital Signs Last 24 Hrs  T(C): 36.8 (12 Dec 2018 09:23), Max: 36.8 (11 Dec 2018 16:44)  T(F): 98.2 (12 Dec 2018 09:23), Max: 98.3 (11 Dec 2018 16:44)  HR: 86 (12 Dec 2018 09:23) (80 - 93)  BP: 134/86 (12 Dec 2018 09:23) (99/70 - 147/81)  BP(mean): --  RR: 18 (12 Dec 2018 09:23) (16 - 22)  SpO2: 100% (12 Dec 2018 09:23) (92% - 100%)    18 @ 07:01  -  18 @ 10:57  --------------------------------------------------------  IN: 0 mL / OUT: 500 mL / NET: -500 mL    PHYSICAL EXAM:  General: NAD, on vEEG  HEENT: NC/AT, anicteric sclera; MMM, b/l eye bulging  Neck: supple  Cardiovascular: +S1/S2, RRR  Respiratory: CTA B/L, no W/R/R, on 1L NC, cough  Gastrointestinal: soft, NT/ND; +BSx4  : premafed to suction draining clear yellow urine  Extremities: WWP; no edema, clubbing or cyanosis  Vascular: 2+ radial, DP/PT pulses B/L  Neurological: AAOx3, RUE 5/5 strength LUE 3/5 strength, 3/5 strength in RLE, 0/5 strength in LLE, reduced sensation of R face and of LLE    MEDICATIONS  (STANDING):  amitriptyline 150 milliGRAM(s) Oral at bedtime  amLODIPine   Tablet 10 milliGRAM(s) Oral daily  ATENolol  Tablet 25 milliGRAM(s) Oral daily  heparin  Injectable 7500 Unit(s) SubCutaneous every 8 hours  hydrochlorothiazide 12.5 milliGRAM(s) Oral daily  lidocaine   Patch 1 Patch Transdermal daily  mirtazapine 30 milliGRAM(s) Oral at bedtime  montelukast 10 milliGRAM(s) Oral daily  nitrofurantoin monohydrate/macrocrystals (MACROBID) 100 milliGRAM(s) Oral two times a day with meals  phenytoin   Capsule 200 milliGRAM(s) Oral two times a day  pregabalin 150 milliGRAM(s) Oral two times a day    MEDICATIONS  (PRN):  LORazepam   Injectable 1 milliGRAM(s) IV Push every 4 hours PRN seizure activity > 2 min    Allergies  penicillin (Rash)    LABS:                      12.3   6.5   )-----------( 259      ( 10 Dec 2018 23:04 )             37.3   12-10  138  |  100  |  11  ----------------------------<  111<H>  5.0   |  24  |  0.79  Ca    8.4      10 Dec 2018 23:04  TPro  7.2  /  Alb  3.7  /  TBili  <0.2  /  DBili  x   /  AST  36  /  ALT  27  /  AlkPhos  109  12-10    Urinalysis Basic - ( 11 Dec 2018 18:16 )  Color: Yellow / Appearance: SL Cloudy / S.010 / pH: x  Gluc: x / Ketone: NEGATIVE  / Bili: Negative / Urobili: 0.2 E.U./dL   Blood: x / Protein: NEGATIVE mg/dL / Nitrite: NEGATIVE   Leuk Esterase: NEGATIVE / RBC: < 5 /HPF / WBC < 5 /HPF   Sq Epi: x / Non Sq Epi: 5-10 /HPF / Bacteria: Present /HPF    POCT Blood Glucose.: 103 mg/dL (10 Dec 2018 22:40)    RADIOLOGY & ADDITIONAL TESTS: Reviewed.  < from: CT Head No Cont (12.10.18 @ 23:43) >  IMPRESSION:   No acute intracranial abnormality. Reported brain tumor is poorly   visualized on this noncontrast study. Correlation with prior outside   imaging is advised.

## 2018-12-12 NOTE — PROGRESS NOTE ADULT - PROBLEM SELECTOR PLAN 1
Diagnosed when age 14yo, with supposed hx of brain tumor not visualized properly on CT in hospital. On multiple medications, listed below, that may affect seizure threshold but unlikely cause at this time.   -Although pt asymptomatic in terms of urinary symptoms and UA with epithelial cells will treat this as UTI as infection may have potential contribution to seizure activity: c/w macrobid 100 mg BID for three days   -lactate elevated at 3.5, after witnessed seizure episode in ED, pt refusing repeat lactate at this time. Mentating and perfusing well.   -Recent change of Dilantin 150mg BID to 200mg BID. Level was elevated at 27, will repeat tomorrow am. Reduce dilantin to 200mg daily for now   -f/u vEEG results   -ordered for ativan 2mg prn if needed for further seizure activity   -MRI if needed can be done as an outpatient, will not perform here but can obtain collateral on "brain tumor" when NP reachable   -technically mirtazapine, amitriptyline, and oxycodone have potential to lower seizure threshold; however, discussed with neurology and will continue these home medications for now.   -Both NP Lapin and son contacted for collateral and message left, awaiting response Diagnosed at 12yo, with supposed hx of brain tumor not visualized properly on CT this admission. On multiple medications, listed below, that may affect seizure threshold but unlikely cause at this time.   -lactate elevated at 3.5, after witnessed seizure episode in ED, pt refused repeat lactate. Mentating and perfusing well.   -Recent change of Dilantin 150mg BID to 200mg BID. Level was elevated at 27. Reduce dilantin to 200mg daily for now  -f/u Dilantin level  -f/u vEEG results   -ordered for ativan 2mg prn if needed for further seizure activity   -MRI if needed can be done as an outpatient, will not perform here but can obtain collateral on "brain tumor" when NP reachable   -technically mirtazapine, amitriptyline, and oxycodone have potential to lower seizure threshold; however, discussed with neurology and will continue these home medications for now.   -Both NP Lapin and son contacted for collateral and message left, awaiting response Diagnosed at 12yo, with supposed hx of brain tumor not visualized properly on CT this admission. On multiple medications, listed below, that may affect seizure threshold but unlikely cause at this time.   -lactate elevated at 3.5, after witnessed seizure episode in ED, pt refused repeat lactate. Mentating and perfusing well.   -Recent change of Dilantin 150mg BID to 200mg BID. Level was elevated at 27. Reduce dilantin to 200mg daily for now  -f/u Dilantin level  -f/u vEEG results, so far no epileptiform activity noted  -ordered for ativan 2mg prn if needed for further seizure activity   -MRI if needed can be done as an outpatient, will not perform here but can obtain collateral on "brain tumor" when NP reachable   -technically mirtazapine, amitriptyline, and oxycodone have potential to lower seizure threshold; however, discussed with neurology and will continue these home medications for now.   -Both NP Lapin and son contacted for collateral and message left, awaiting response

## 2018-12-12 NOTE — PROGRESS NOTE ADULT - PROBLEM SELECTOR PLAN 2
Slightly elevated in 150s systolic at this time.   -c/w HCTZ 12.5mg   -atenolol 25mg qd   -norvasc 10mg qd -Although pt asymptomatic in terms of urinary symptoms and UA with epithelial cells will treat this as UTI as infection may have potential contribution to seizure activity: c/w macrobid 100 mg BID for three days Although pt asymptomatic in terms of urinary symptoms and UA with epithelial cells will treat this as UTI as infection may have potential contribution to seizure activity  - c/w macrobid 100 mg BID for three days

## 2018-12-12 NOTE — PHYSICAL THERAPY INITIAL EVALUATION ADULT - BED MOBILITY LIMITATIONS, REHAB EVAL
impaired ability to control trunk for mobility/decreased ability to use arms for pushing/pulling/decreased ability to use legs for bridging/pushing Pt only able to dangled at edge of bed for~ 1 min with mod A x 1, unable to sit longer secondary pt complains feeling headache, neck stiffness and requested to return to bed. Dr Costa is notified after PT eval./decreased ability to use arms for pushing/pulling/decreased ability to use legs for bridging/pushing/impaired ability to control trunk for mobility

## 2018-12-13 VITALS
HEART RATE: 86 BPM | OXYGEN SATURATION: 100 % | SYSTOLIC BLOOD PRESSURE: 132 MMHG | RESPIRATION RATE: 18 BRPM | TEMPERATURE: 98 F | DIASTOLIC BLOOD PRESSURE: 87 MMHG

## 2018-12-13 PROBLEM — Z00.00 ENCOUNTER FOR PREVENTIVE HEALTH EXAMINATION: Status: ACTIVE | Noted: 2018-12-13

## 2018-12-13 LAB
ANION GAP SERPL CALC-SCNC: 15 MMOL/L — SIGNIFICANT CHANGE UP (ref 5–17)
BUN SERPL-MCNC: 13 MG/DL — SIGNIFICANT CHANGE UP (ref 7–23)
CALCIUM SERPL-MCNC: 9.2 MG/DL — SIGNIFICANT CHANGE UP (ref 8.4–10.5)
CHLORIDE SERPL-SCNC: 100 MMOL/L — SIGNIFICANT CHANGE UP (ref 96–108)
CO2 SERPL-SCNC: 25 MMOL/L — SIGNIFICANT CHANGE UP (ref 22–31)
CREAT SERPL-MCNC: 0.84 MG/DL — SIGNIFICANT CHANGE UP (ref 0.5–1.3)
GLUCOSE SERPL-MCNC: 96 MG/DL — SIGNIFICANT CHANGE UP (ref 70–99)
HCT VFR BLD CALC: 38.8 % — SIGNIFICANT CHANGE UP (ref 34.5–45)
HGB BLD-MCNC: 12.1 G/DL — SIGNIFICANT CHANGE UP (ref 11.5–15.5)
MAGNESIUM SERPL-MCNC: 2.1 MG/DL — SIGNIFICANT CHANGE UP (ref 1.6–2.6)
MCHC RBC-ENTMCNC: 31.1 PG — SIGNIFICANT CHANGE UP (ref 27–34)
MCHC RBC-ENTMCNC: 31.2 G/DL — LOW (ref 32–36)
MCV RBC AUTO: 99.7 FL — SIGNIFICANT CHANGE UP (ref 80–100)
PLATELET # BLD AUTO: 232 K/UL — SIGNIFICANT CHANGE UP (ref 150–400)
POTASSIUM SERPL-MCNC: 4.6 MMOL/L — SIGNIFICANT CHANGE UP (ref 3.5–5.3)
POTASSIUM SERPL-SCNC: 4.6 MMOL/L — SIGNIFICANT CHANGE UP (ref 3.5–5.3)
RBC # BLD: 3.89 M/UL — SIGNIFICANT CHANGE UP (ref 3.8–5.2)
RBC # FLD: 13.5 % — SIGNIFICANT CHANGE UP (ref 10.3–16.9)
SODIUM SERPL-SCNC: 140 MMOL/L — SIGNIFICANT CHANGE UP (ref 135–145)
TSH SERPL-MCNC: 0.45 UIU/ML — SIGNIFICANT CHANGE UP (ref 0.35–4.94)
WBC # BLD: 6.5 K/UL — SIGNIFICANT CHANGE UP (ref 3.8–10.5)
WBC # FLD AUTO: 6.5 K/UL — SIGNIFICANT CHANGE UP (ref 3.8–10.5)

## 2018-12-13 PROCEDURE — 84443 ASSAY THYROID STIM HORMONE: CPT

## 2018-12-13 PROCEDURE — 93005 ELECTROCARDIOGRAM TRACING: CPT

## 2018-12-13 PROCEDURE — 85025 COMPLETE CBC W/AUTO DIFF WBC: CPT

## 2018-12-13 PROCEDURE — 81001 URINALYSIS AUTO W/SCOPE: CPT

## 2018-12-13 PROCEDURE — 96374 THER/PROPH/DIAG INJ IV PUSH: CPT

## 2018-12-13 PROCEDURE — 82803 BLOOD GASES ANY COMBINATION: CPT

## 2018-12-13 PROCEDURE — 99285 EMERGENCY DEPT VISIT HI MDM: CPT | Mod: 25

## 2018-12-13 PROCEDURE — 82962 GLUCOSE BLOOD TEST: CPT

## 2018-12-13 PROCEDURE — 80048 BASIC METABOLIC PNL TOTAL CA: CPT

## 2018-12-13 PROCEDURE — 87086 URINE CULTURE/COLONY COUNT: CPT

## 2018-12-13 PROCEDURE — 71045 X-RAY EXAM CHEST 1 VIEW: CPT

## 2018-12-13 PROCEDURE — 36415 COLL VENOUS BLD VENIPUNCTURE: CPT

## 2018-12-13 PROCEDURE — 80053 COMPREHEN METABOLIC PANEL: CPT

## 2018-12-13 PROCEDURE — 80307 DRUG TEST PRSMV CHEM ANLYZR: CPT

## 2018-12-13 PROCEDURE — 83735 ASSAY OF MAGNESIUM: CPT

## 2018-12-13 PROCEDURE — 97162 PT EVAL MOD COMPLEX 30 MIN: CPT

## 2018-12-13 PROCEDURE — 85027 COMPLETE CBC AUTOMATED: CPT

## 2018-12-13 PROCEDURE — 80185 ASSAY OF PHENYTOIN TOTAL: CPT

## 2018-12-13 PROCEDURE — 83605 ASSAY OF LACTIC ACID: CPT

## 2018-12-13 PROCEDURE — 70450 CT HEAD/BRAIN W/O DYE: CPT

## 2018-12-13 PROCEDURE — 94640 AIRWAY INHALATION TREATMENT: CPT

## 2018-12-13 PROCEDURE — 95951: CPT | Mod: 26

## 2018-12-13 PROCEDURE — 99232 SBSQ HOSP IP/OBS MODERATE 35: CPT

## 2018-12-13 RX ADMIN — HEPARIN SODIUM 7500 UNIT(S): 5000 INJECTION INTRAVENOUS; SUBCUTANEOUS at 12:12

## 2018-12-13 RX ADMIN — HEPARIN SODIUM 7500 UNIT(S): 5000 INJECTION INTRAVENOUS; SUBCUTANEOUS at 06:18

## 2018-12-13 RX ADMIN — LIDOCAINE 1 PATCH: 4 CREAM TOPICAL at 12:11

## 2018-12-13 RX ADMIN — Medication 200 MILLIGRAM(S): at 06:18

## 2018-12-13 RX ADMIN — Medication 200 MILLIGRAM(S): at 18:29

## 2018-12-13 RX ADMIN — Medication 100 MILLIGRAM(S): at 12:11

## 2018-12-13 RX ADMIN — AMLODIPINE BESYLATE 10 MILLIGRAM(S): 2.5 TABLET ORAL at 06:17

## 2018-12-13 RX ADMIN — ATENOLOL 25 MILLIGRAM(S): 25 TABLET ORAL at 06:25

## 2018-12-13 RX ADMIN — Medication 12.5 MILLIGRAM(S): at 06:18

## 2018-12-13 RX ADMIN — Medication 100 MILLIGRAM(S): at 06:18

## 2018-12-13 RX ADMIN — Medication 150 MILLIGRAM(S): at 06:17

## 2018-12-13 RX ADMIN — MONTELUKAST 10 MILLIGRAM(S): 4 TABLET, CHEWABLE ORAL at 12:11

## 2018-12-13 RX ADMIN — Medication 150 MILLIGRAM(S): at 18:29

## 2018-12-13 NOTE — PROGRESS NOTE ADULT - PROBLEM SELECTOR PLAN 2
Although pt asymptomatic in terms of urinary symptoms and UA with epithelial cells will treat this as UTI as infection may have potential contribution to seizure activity  - c/w macrobid 100 mg BID for three days RESOLVED. Although pt asymptomatic in terms of urinary symptoms and UA with epithelial cells will treat this as UTI as infection may have potential contribution to seizure activity  - completed macrobid 100 mg BID for three days

## 2018-12-13 NOTE — PROGRESS NOTE ADULT - PROBLEM SELECTOR PLAN 6
Pt noted to have bulging eyes on exam  - f/u AM TSH Pt noted to have bulging eyes on exam  - TSH wnl
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
## 2018-12-13 NOTE — PROGRESS NOTE ADULT - PROBLEM SELECTOR PLAN 1
Diagnosed at 12yo, with supposed hx of brain tumor not visualized properly on CT this admission. On multiple medications, listed below, that may affect seizure threshold but unlikely cause at this time.   -lactate elevated at 3.5, after witnessed seizure episode in ED, pt refused repeat lactate. Mentating and perfusing well.   -Recent change of Dilantin 150mg BID to 200mg BID. Level was elevated at 27. Reduce dilantin to 200mg daily for now  -f/u Dilantin level  -f/u vEEG results, so far no epileptiform activity noted  -ordered for ativan 2mg prn if needed for further seizure activity   -MRI if needed can be done as an outpatient, will not perform here but can obtain collateral on "brain tumor" when NP reachable   -technically mirtazapine, amitriptyline, and oxycodone have potential to lower seizure threshold; however, discussed with neurology and will continue these home medications for now.   -Both NP Lapin and son contacted for collateral and message left, awaiting response Diagnosed at 14yo, with supposed hx of brain tumor not visualized properly on CT this admission. On multiple medications, listed below, that may affect seizure threshold but unlikely cause at this time.   -lactate elevated at 3.5, after witnessed seizure episode in ED, pt refused repeat lactate. Mentating and perfusing well.   -Recent change of Dilantin 150mg BID to 200mg BID. Level was elevated at 27.   - no epileptiform activity noted on vEEG  -ordered for ativan 2mg prn if needed for further seizure activity   -MRI if needed can be done as an outpatient, will not perform here but can obtain collateral on "brain tumor" when NP reachable   -continue mirtazapine, amitriptyline, and oxycodone  - pt to follow up outpatient with Dr. Johnston

## 2018-12-13 NOTE — PROGRESS NOTE ADULT - PROBLEM SELECTOR PLAN 8
1) PCP Contacted on Admission: (Y/N) --> Name & Phone #: RAMIRO Brian Silva [phone], contacted 12/11, awaiting response   2) Date of Contact with PCP:  3) PCP Contacted at Discharge: (Y/N, N/A)  4) Summary of Handoff Given to PCP:   5) Post-Discharge Appointment Date and Location:
1) PCP Contacted on Admission: (Y/N) --> Name & Phone #: RAMIRO Brian Silva [phone], contacted 12/11, awaiting response   2) Date of Contact with PCP:  3) PCP Contacted at Discharge: (Y/N, N/A)  4) Summary of Handoff Given to PCP:   5) Post-Discharge Appointment Date and Location:

## 2018-12-13 NOTE — PROGRESS NOTE ADULT - PROBLEM SELECTOR PLAN 7
DASH diet  Ppx: c/w amitriptyline 150mg qhs and mirtazapine 30mg qhs for insomnia, HSQ 7500 q8h
DASH diet  Ppx: c/w amitriptyline 150mg qhs and mirtazapine 30mg qhs for insomnia, HSQ 7500 q8h

## 2018-12-13 NOTE — PROGRESS NOTE ADULT - SUBJECTIVE AND OBJECTIVE BOX
OVERNIGHT EVENTS:    SUBJECTIVE / INTERVAL HPI: Patient seen and examined at bedside.     VITAL SIGNS:  Vital Signs Last 24 Hrs  T(C): 36.8 (13 Dec 2018 05:19), Max: 36.8 (12 Dec 2018 09:23)  T(F): 98.3 (13 Dec 2018 05:19), Max: 98.3 (13 Dec 2018 05:19)  HR: 89 (13 Dec 2018 05:19) (78 - 89)  BP: 110/77 (13 Dec 2018 05:19) (107/67 - 134/86)  BP(mean): --  RR: 20 (13 Dec 2018 05:19) (16 - 20)  SpO2: 100% (13 Dec 2018 05:19) (99% - 100%)      18 @ 07:01  -  18 @ 07:00  --------------------------------------------------------  IN: 0 mL / OUT: 1500 mL / NET: -1500 mL        PHYSICAL EXAM:  General: NAD  HEENT: NC/AT, anicteric sclera; MMM  Neck: supple  Cardiovascular: +S1/S2, RRR  Respiratory: CTA B/L, no W/R/R  Gastrointestinal: soft, NT/ND; +BSx4  Extremities: WWP; no edema, clubbing or cyanosis  Vascular: 2+ radial, DP/PT pulses B/L  Neurological: AAOx3, no focal deficits    MEDICATIONS  (STANDING):  amitriptyline 150 milliGRAM(s) Oral at bedtime  amLODIPine   Tablet 10 milliGRAM(s) Oral daily  ATENolol  Tablet 25 milliGRAM(s) Oral daily  heparin  Injectable 7500 Unit(s) SubCutaneous every 8 hours  hydrochlorothiazide 12.5 milliGRAM(s) Oral daily  lidocaine   Patch 1 Patch Transdermal daily  mirtazapine 30 milliGRAM(s) Oral at bedtime  montelukast 10 milliGRAM(s) Oral daily  nitrofurantoin monohydrate/macrocrystals (MACROBID) 100 milliGRAM(s) Oral two times a day with meals  phenytoin   Capsule 200 milliGRAM(s) Oral two times a day  pregabalin 150 milliGRAM(s) Oral two times a day    MEDICATIONS  (PRN):  ALBUTerol/ipratropium for Nebulization 3 milliLiter(s) Nebulizer every 6 hours PRN Shortness of Breath and/or Wheezing  LORazepam   Injectable 1 milliGRAM(s) IV Push every 4 hours PRN seizure activity > 2 min      Allergies    penicillin (Rash)    Intolerances        LABS:                        12.1   6.5   )-----------( 232      ( 13 Dec 2018 06:33 )             38.8         140  |  100  |  13  ----------------------------<  96  4.6   |  25  |  0.84    Ca    9.2      13 Dec 2018 06:33  Mg     2.1             Urinalysis Basic - ( 11 Dec 2018 18:16 )    Color: Yellow / Appearance: SL Cloudy / S.010 / pH: x  Gluc: x / Ketone: NEGATIVE  / Bili: Negative / Urobili: 0.2 E.U./dL   Blood: x / Protein: NEGATIVE mg/dL / Nitrite: NEGATIVE   Leuk Esterase: NEGATIVE / RBC: < 5 /HPF / WBC < 5 /HPF   Sq Epi: x / Non Sq Epi: 5-10 /HPF / Bacteria: Present /HPF      CAPILLARY BLOOD GLUCOSE              RADIOLOGY & ADDITIONAL TESTS: Reviewed. OVERNIGHT EVENTS: Pt reported 3 second seizure activity at 5:17AM and 3-5 second seizure activity 30 minutes later. Nurse was notified.    SUBJECTIVE / INTERVAL HPI: Patient seen and examined at bedside. Patient is complaining of back pain and L sided weakness.    VITAL SIGNS:  Vital Signs Last 24 Hrs  T(C): 36.8 (13 Dec 2018 05:19), Max: 36.8 (12 Dec 2018 09:23)  T(F): 98.3 (13 Dec 2018 05:19), Max: 98.3 (13 Dec 2018 05:19)  HR: 89 (13 Dec 2018 05:19) (78 - 89)  BP: 110/77 (13 Dec 2018 05:19) (107/67 - 134/86)  BP(mean): --  RR: 20 (13 Dec 2018 05:19) (16 - 20)  SpO2: 100% (13 Dec 2018 05:19) (99% - 100%)    18 @ 07:01  -  18 @ 07:00  --------------------------------------------------------  IN: 0 mL / OUT: 1500 mL / NET: -1500 mL    PHYSICAL EXAM:  General: Obese, Appears uncomfortable when she attempts to move in bed, on vEEG  HEENT: NC/AT, anicteric sclera; MMM  Cardiovascular: +S1/S2, RRR  Respiratory: CTA B/L, no W/R/R  Gastrointestinal: soft, NT/ND; +BSx4  : on draining cath to suction with yellow urine  Extremities: WWP; no edema, clubbing or cyanosis  Vascular: 2+ radial, DP/PT pulses B/L  Neurological: AAOx3, RUE 5/5 strength LUE 3/5 strength, 3/5 strength in RLE, 0/5 strength in LLE, reduced sensation of R face in V3 distribution and of LLE, CN II-XII otherwise intact    MEDICATIONS  (STANDING):  amitriptyline 150 milliGRAM(s) Oral at bedtime  amLODIPine   Tablet 10 milliGRAM(s) Oral daily  ATENolol  Tablet 25 milliGRAM(s) Oral daily  heparin  Injectable 7500 Unit(s) SubCutaneous every 8 hours  hydrochlorothiazide 12.5 milliGRAM(s) Oral daily  lidocaine   Patch 1 Patch Transdermal daily  mirtazapine 30 milliGRAM(s) Oral at bedtime  montelukast 10 milliGRAM(s) Oral daily  nitrofurantoin monohydrate/macrocrystals (MACROBID) 100 milliGRAM(s) Oral two times a day with meals  phenytoin   Capsule 200 milliGRAM(s) Oral two times a day  pregabalin 150 milliGRAM(s) Oral two times a day    MEDICATIONS  (PRN):  ALBUTerol/ipratropium for Nebulization 3 milliLiter(s) Nebulizer every 6 hours PRN Shortness of Breath and/or Wheezing  LORazepam   Injectable 1 milliGRAM(s) IV Push every 4 hours PRN seizure activity > 2 min    Allergies  penicillin (Rash)    LABS:                      12.1   6.5   )-----------( 232      ( 13 Dec 2018 06:33 )             38.8   12-  140  |  100  |  13  ----------------------------<  96  4.6   |  25  |  0.84  Ca    9.2      13 Dec 2018 06:33  Mg     2.1     12-    Urinalysis Basic - ( 11 Dec 2018 18:16 )  Color: Yellow / Appearance: SL Cloudy / S.010 / pH: x  Gluc: x / Ketone: NEGATIVE  / Bili: Negative / Urobili: 0.2 E.U./dL   Blood: x / Protein: NEGATIVE mg/dL / Nitrite: NEGATIVE   Leuk Esterase: NEGATIVE / RBC: < 5 /HPF / WBC < 5 /HPF   Sq Epi: x / Non Sq Epi: 5-10 /HPF / Bacteria: Present /HPF    RADIOLOGY & ADDITIONAL TESTS: Reviewed.

## 2018-12-17 DIAGNOSIS — G40.909 EPILEPSY, UNSPECIFIED, NOT INTRACTABLE, WITHOUT STATUS EPILEPTICUS: ICD-10-CM

## 2018-12-17 DIAGNOSIS — J45.909 UNSPECIFIED ASTHMA, UNCOMPLICATED: ICD-10-CM

## 2018-12-17 DIAGNOSIS — N39.0 URINARY TRACT INFECTION, SITE NOT SPECIFIED: ICD-10-CM

## 2018-12-17 DIAGNOSIS — I10 ESSENTIAL (PRIMARY) HYPERTENSION: ICD-10-CM

## 2018-12-17 DIAGNOSIS — R53.1 WEAKNESS: ICD-10-CM

## 2019-01-11 ENCOUNTER — APPOINTMENT (OUTPATIENT)
Dept: NEUROLOGY | Facility: CLINIC | Age: 49
End: 2019-01-11
Payer: MEDICARE

## 2019-01-11 VITALS
SYSTOLIC BLOOD PRESSURE: 104 MMHG | OXYGEN SATURATION: 94 % | TEMPERATURE: 98.8 F | HEART RATE: 97 BPM | DIASTOLIC BLOOD PRESSURE: 72 MMHG

## 2019-01-11 VITALS — HEIGHT: 64 IN | BODY MASS INDEX: 41.83 KG/M2 | WEIGHT: 245 LBS

## 2019-01-11 PROCEDURE — 99214 OFFICE O/P EST MOD 30 MIN: CPT

## 2019-01-11 RX ORDER — PREGABALIN 150 MG/1
150 CAPSULE ORAL
Refills: 0 | Status: ACTIVE | COMMUNITY

## 2019-01-11 RX ORDER — AMITRIPTYLINE HYDROCHLORIDE 150 MG/1
150 TABLET, FILM COATED ORAL
Refills: 0 | Status: ACTIVE | COMMUNITY

## 2019-01-11 NOTE — HISTORY OF PRESENT ILLNESS
[FreeTextEntry1] : Presented   for     evaluation of     seizures.   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  She    states    that she   was    on  Dilantin   since    the   age  of  14  y.o.   She   is    currently    taking Lyrica   150  mg   twice  a   day    and   Dilantin  200    mg     PO  twice  a    day.   She   denies   seizures     after    discharge.  Patient    is  a  marginal   historian    and   has  a  poor understanding   of   the    concept    of   non-epileptic     events.   It   is    impossible to   understand  if the   seizures    captured  in the   hospital    are   similar   to    the   habitual   events. \par \par She    is   c/o  sleep   difficulties.

## 2019-01-11 NOTE — REVIEW OF SYSTEMS
[As Noted in HPI] : as noted in HPI [Leg Weakness] : leg weakness [Seizures] : convulsions [Negative] : Heme/Lymph [FreeTextEntry9] : neck pain

## 2019-01-11 NOTE — DISCUSSION/SUMMARY
[FreeTextEntry1] : Non-epileptic   events     were   captured,  however    it is   unclear    if  the   patient  has   an  epileptic  events  as well\par She is   c/o     neck pain, will be    referred   to  Pain    management

## 2019-01-11 NOTE — PHYSICAL EXAM
[General Appearance - Alert] : alert [General Appearance - In No Acute Distress] : in no acute distress [Oriented To Time, Place, And Person] : oriented to person, place, and time [Impaired Insight] : insight and judgment were intact [Affect] : the affect was normal [Person] : oriented to person [Place] : oriented to place [Time] : oriented to time [Concentration Intact] : normal concentrating ability [Visual Intact] : visual attention was ~T not ~L decreased [Naming Objects] : no difficulty naming common objects [Repeating Phrases] : no difficulty repeating a phrase [Writing A Sentence] : no difficulty writing a sentence [Fluency] : fluency intact [Comprehension] : comprehension intact [Reading] : reading intact [Past History] : adequate knowledge of personal past history [Cranial Nerves Optic (II)] : visual acuity intact bilaterally,  visual fields full to confrontation, pupils equal round and reactive to light [Cranial Nerves Oculomotor (III)] : extraocular motion intact [Cranial Nerves Trigeminal (V)] : facial sensation intact symmetrically [Cranial Nerves Facial (VII)] : face symmetrical [Cranial Nerves Vestibulocochlear (VIII)] : hearing was intact bilaterally [Cranial Nerves Glossopharyngeal (IX)] : tongue and palate midline [Cranial Nerves Accessory (XI - Cranial And Spinal)] : head turning and shoulder shrug symmetric [Cranial Nerves Hypoglossal (XII)] : there was no tongue deviation with protrusion [Motor Tone] : muscle tone was normal in all four extremities [Motor Strength] : muscle strength was normal in all four extremities [No Muscle Atrophy] : normal bulk in all four extremities [Sensation Tactile Decrease] : light touch was intact [Abnormal Walk] : normal gait [Balance] : balance was intact [2+] : Ankle jerk left 2+ [Past-pointing] : there was no past-pointing [Tremor] : no tremor present [Plantar Reflex Right Only] : normal on the right [Plantar Reflex Left Only] : normal on the left [FreeTextEntry6] : poor   effort,   ambulates   with  assistance    of    walker

## 2019-04-08 ENCOUNTER — OTHER (OUTPATIENT)
Age: 49
End: 2019-04-08

## 2019-04-11 ENCOUNTER — APPOINTMENT (OUTPATIENT)
Dept: NEUROLOGY | Facility: CLINIC | Age: 49
End: 2019-04-11
Payer: MEDICARE

## 2019-04-11 VITALS
HEART RATE: 85 BPM | BODY MASS INDEX: 42.51 KG/M2 | WEIGHT: 249 LBS | SYSTOLIC BLOOD PRESSURE: 149 MMHG | TEMPERATURE: 98.2 F | DIASTOLIC BLOOD PRESSURE: 93 MMHG | HEIGHT: 64 IN

## 2019-04-11 DIAGNOSIS — R56.9 UNSPECIFIED CONVULSIONS: ICD-10-CM

## 2019-04-11 DIAGNOSIS — M54.2 CERVICALGIA: ICD-10-CM

## 2019-04-11 DIAGNOSIS — F44.9 DISSOCIATIVE AND CONVERSION DISORDER, UNSPECIFIED: ICD-10-CM

## 2019-04-11 PROCEDURE — 99215 OFFICE O/P EST HI 40 MIN: CPT

## 2019-04-11 RX ORDER — PHENYTOIN SODIUM 100 MG/1
100 CAPSULE ORAL 3 TIMES DAILY
Qty: 90 | Refills: 3 | Status: ACTIVE | COMMUNITY

## 2019-04-11 NOTE — HISTORY OF PRESENT ILLNESS
[FreeTextEntry1] : Presented    to  follow   up   and  further  management.    She     states    that   she   was    recently    admitted    to the    outside    facility   due  to  the   seizure.  Her   Dilantin   dose   was    was  recently  decreased      to   100   mg   TID (after   admission) from   200 mg  BID.  She     states    that  she   feels    better    after   the  dose    of  Dilantin    was   decreased.  She    is   still    c/o    neck pain   for   which     she  has been   followed  by  PCP.  She    is   still  taking   Lyrica    150    mg    BID.

## 2019-04-11 NOTE — PHYSICAL EXAM
[General Appearance - Alert] : alert [General Appearance - In No Acute Distress] : in no acute distress [Oriented To Time, Place, And Person] : oriented to person, place, and time [Impaired Insight] : insight and judgment were intact [Affect] : the affect was normal [Person] : oriented to person [Place] : oriented to place [Time] : oriented to time [Concentration Intact] : normal concentrating ability [Visual Intact] : visual attention was ~T not ~L decreased [Naming Objects] : no difficulty naming common objects [Repeating Phrases] : no difficulty repeating a phrase [Writing A Sentence] : no difficulty writing a sentence [Fluency] : fluency intact [Comprehension] : comprehension intact [Reading] : reading intact [Past History] : adequate knowledge of personal past history [Cranial Nerves Optic (II)] : visual acuity intact bilaterally,  visual fields full to confrontation, pupils equal round and reactive to light [Cranial Nerves Oculomotor (III)] : extraocular motion intact [Cranial Nerves Trigeminal (V)] : facial sensation intact symmetrically [Cranial Nerves Vestibulocochlear (VIII)] : hearing was intact bilaterally [Cranial Nerves Facial (VII)] : face symmetrical [Cranial Nerves Glossopharyngeal (IX)] : tongue and palate midline [Cranial Nerves Accessory (XI - Cranial And Spinal)] : head turning and shoulder shrug symmetric [Cranial Nerves Hypoglossal (XII)] : there was no tongue deviation with protrusion [Motor Tone] : muscle tone was normal in all four extremities [Motor Strength] : muscle strength was normal in all four extremities [No Muscle Atrophy] : normal bulk in all four extremities [Sensation Tactile Decrease] : light touch was intact [Abnormal Walk] : normal gait [Balance] : balance was intact [2+] : Ankle jerk left 2+ [Past-pointing] : there was no past-pointing [Tremor] : no tremor present [Plantar Reflex Right Only] : normal on the right [Plantar Reflex Left Only] : normal on the left [FreeTextEntry6] : poor   effort,   ambulates   with  assistance    of    walker

## 2019-04-11 NOTE — REVIEW OF SYSTEMS
[Memory Lapses or Loss] : memory loss [Seizures] : convulsions [As Noted in HPI] : as noted in HPI [Negative] : Endocrine [FreeTextEntry9] : neck pain

## 2019-04-11 NOTE — DISCUSSION/SUMMARY
[FreeTextEntry1] : Conversion     disorder,  however     epileptic   seizures    can  not  be     completely    ruled  out. \par I  recommended      to   completely     d/c   Dilantin   and     increase  the   dose    of  Lyrica,  Pt  is    not   willing   to  make    any   meds     changes    at this    time. \par Continue current     dose    of    Dilantin.

## 2019-08-06 ENCOUNTER — APPOINTMENT (OUTPATIENT)
Dept: NEUROLOGY | Facility: CLINIC | Age: 49
End: 2019-08-06

## 2019-09-16 ENCOUNTER — APPOINTMENT (OUTPATIENT)
Dept: NEUROLOGY | Facility: CLINIC | Age: 49
End: 2019-09-16

## 2019-12-27 ENCOUNTER — OTHER (OUTPATIENT)
Age: 49
End: 2019-12-27

## 2020-09-24 ENCOUNTER — APPOINTMENT (OUTPATIENT)
Dept: NEUROLOGY | Facility: CLINIC | Age: 50
End: 2020-09-24

## 2020-11-10 NOTE — ED PROVIDER NOTE - OBJECTIVE STATEMENT
49 y/o F w/known seizure disorder on dilantin (recently increased dose from 150 BID to 200mg BID), p/w several seziures in succession today, each lasting 10-15s. Pt felt an aura prior to initiation of seizures, which is typical. No trauma/falls/head injury. No loss of bowel/bladder continence. Family and EMS state they witnessed full tonic/clonic seizures. EMS delivered 10mg Versed IM during last seizure (of which there were reportedly 5) w/ of seizure activity. No seizures since that time. Finger stick normal in the field and in triage. At time of interview pt is feeling globally fatigued with a mild global HA. No visual sx, CP, SOB, neck stiffness, fever/chills or recent illness. No n/v or abd pain. Pt has an epileptologist in [place] which she has not seen in several months. Last hospitalization was 2wks ago at [facility], which is when dilantin was increased. 47 y/o F w/known seizure disorder on dilantin (recently increased dose from 150 BID to 200mg BID), p/w several seziures in succession today, each lasting 10-15s. Pt felt an aura prior to initiation of seizures, which is typical. No trauma/falls/head injury. No loss of bowel/bladder continence. Family and EMS state they witnessed full tonic/clonic seizures. EMS delivered 10mg Versed IM during last seizure (of which there were reportedly 5) w/ of seizure activity. No seizures since that time. Finger stick normal in the field and in triage. At time of interview pt is feeling globally fatigued with a mild global HA. No visual sx, CP, SOB, neck stiffness, fever/chills or recent illness. No n/v or abd pain. Pt has an epileptologist in [place] which she has not seen in several months. Last hospitalization was 2wks ago at [facility], which is when dilantin was increased. Per son, pt has been diagnosed with a brain tumor that is reportedly the cause of her seizure activity, though pt has had a seizure disorder for most of her life. Is This A New Presentation, Or A Follow-Up?: Growths How Severe Is Your Skin Lesion?: moderate Additional History: Patient reports wants lessons removed as they are itchy.

## 2024-01-18 NOTE — DISCHARGE NOTE ADULT - PLAN OF CARE
Simon no answer.  JACINTA spoke with CM Please continue to take your anti-seizure medications and follow up with your doctor You came to the hospital after experiencing multiple seizures in a day including in the ambulance and in the ER. You were admitted to monitor your seizure activity on EEG. You were continued on your home medication of 200mg Dilantin twice a day. You should continue on this medication at home. You can arrange to have an outpatient MRI with your doctor if they recommend it. You were found to have a urinary tract infection (UTI) when you came to the emergency room. You were not having symptoms but you were started on antibiotics for 3 days in case the UTI was the reason that you had a seizure. Continue your home lyrica and lidocaine patches. Continue your home oxycodone as you need it for pain. Physical therapy attempted to see you in the hospital but you were in pain and did not wish for them to continue. It is recommended that you follow up with your doctor to evaluate for a need for physical therapy. Please continue to take Hydrochlorothiazide 12.5mg daily, atenolol 25mg daily and norvasc 10mg daily. Please continue to take your inhalers as prescribed You came to the hospital after experiencing multiple seizures in a day including in the ambulance and in the ER. You were admitted to monitor your seizure activity on EEG. You were continued on your home medication of 200mg Dilantin twice a day. You should continue on this medication at home. You can arrange to have an outpatient MRI with your doctor if they recommend it. Please follow up with Dr. Johnston, a neurologist, on January 10th, 2019 at 9:30PM.

## 2024-07-11 NOTE — ED ADULT TRIAGE NOTE - RESPIRATORY RATE (BREATHS/MIN)
----- Message from Herb Grimm sent at 7/11/2024  3:44 PM CDT -----  Contact: patient  Estefany Berumen would like a call back at [phone], in regards to rescheduling her upcoming procedure.   18

## 2025-02-23 NOTE — PHYSICAL THERAPY INITIAL EVALUATION ADULT - LEVEL OF INDEPENDENCE: SUPINE/SIT, REHAB EVAL
independent/needs device maximum assist (25% patients effort)/moderate assist (50% patients effort) independent/needs device
